# Patient Record
Sex: MALE | Race: WHITE | URBAN - METROPOLITAN AREA
[De-identification: names, ages, dates, MRNs, and addresses within clinical notes are randomized per-mention and may not be internally consistent; named-entity substitution may affect disease eponyms.]

---

## 2020-08-22 ENCOUNTER — HOSPITAL ENCOUNTER (EMERGENCY)
Age: 25
Discharge: LEFT BEFORE DISCHARGE | End: 2020-08-22

## 2020-08-22 VITALS — TEMPERATURE: 97.3 F | HEART RATE: 125 BPM | OXYGEN SATURATION: 91 %

## 2020-08-22 NOTE — ED NOTES
Second attempt to call pt to triage. No answer from waiting room at this time. [facility] staff states that family took pt to SICU waiting room where they are being updated on the status of his brother.   Requested [facility] staff to notify this nurse if pt returns to waiting room     Tripp Bronson RN  08/22/20 0045

## 2020-08-22 NOTE — ED NOTES
Per [facility] staff, pt has not returned from SICU waiting room.   Pt considered left without treatment, before RN triage     Mckenzie Rachel, BRENT  08/22/20 1536

## 2020-08-22 NOTE — ED NOTES
Third attempt to call pt to triage. No answer from waiting room at this time. [facility] staff states pt has not yet returned from SICU waiting room.      Tayler Obrien RN  08/22/20 0246